# Patient Record
(demographics unavailable — no encounter records)

---

## 2025-02-13 NOTE — HISTORY OF PRESENT ILLNESS
[FreeTextEntry1] : Mary presents to the office today with h/o OAB managed with oxybutynin 5mg. Pt states has been on oxybutynin for about 3 years now. Reports 60-70% improvement in OAB symptoms. Pt states frequency and nocturia have improved but urgency and UUI still remains. Pt states dry mouth has become a lot more bothersome and now pt c/o dry eyes and nose. Denies s/s of UTI. Denies sensation of incomplete bladder emptying. PVR normal today. Pt would like to discuss alternative OAB management options today.   PVR: 19 BP: 142/86

## 2025-02-13 NOTE — DISCUSSION/SUMMARY
[FreeTextEntry1] : We discussed management options for overactive bladder including observation, behavioral modifications and bladder training, physical therapy and medications including anticholinergics and beta 3 agonists. Risks/benefits of myrbetriq discussed and we will start that. Gemtesa is not covered in pt's plan. We discussed additional treatment options including sacral neuromodulation, PTNS and intra detrusor Botox. At this time pt would like to continue with medication management but due to bothersome SEs with Oxybuytnin would not like to c/w Oxybutynin. Myrbetriq 25mg eRx sent to pt's preferred pharmacy. Medication instructions given. Pt aware to monitor bp and d/c Myrbetriq if bp >160/100. Pt verbalized understanding. Pt to RTO in 4-6 weeks for PVR check and evaluate Myrbetriq effectiveness. All questions answered. Instructed to call with any questions or concerns.

## 2025-03-25 NOTE — PHYSICAL EXAM
[No Acute Distress] : in no acute distress [Well developed] : well developed [Well Nourished] : ~L well nourished [Good Hygeine] : demonstrates good hygeine [Oriented x3] : oriented to person, place, and time [Normal Memory] : ~T memory was ~L unimpaired [Normal Mood/Affect] : mood and affect are normal [Cough] : no cough [None] : no CVA tenderness [Warm and Dry] : was warm and dry to touch [Normal Gait] : gait was normal

## 2025-03-25 NOTE — DISCUSSION/SUMMARY
[FreeTextEntry1] : Pt doing well with Myrbetriq 25mg but would like to see greater improvement in OAB symptoms. Discussed increasing Myrbetriq to 50mg. Pt agreeable. Myrbetriq 50mg eRx sent to pt's preferred pharmacy. Pt aware to continue monitoring bp with increased dose and d/c Myrbetriq if bp >160/100. Pt verbalized understanding. Pt to RTO in 6 weeks for PVR check and to evaluate Myrbetriq effectiveness at increased dose. All questions answered. Instructed to call with any questions or concerns.

## 2025-03-25 NOTE — HISTORY OF PRESENT ILLNESS
[FreeTextEntry1] : Mary presents to the office today with h/o OAB. Pt last seen on 2/13/25 and was started on Myrbetriq 25mg. Pt was on Oxybutynin in the past but d/kamila due to bothersome SE such as dry mouth and dry eye. Pt reports 70-80% symptom improvement since starting Myrbetriq 25mg and is happy with this. Pt reports nocturia x1 and is able to hold urine longer throughout the day. Pt states urgency still persists but significantly improved than prior to Myrbetriq. Denies s/s of UTI. Denies sensation of incomplete bladder emptying. PVR normal today. Denies SE of Myrbetriq.  PVR: 0 BP: 132/78

## 2025-05-07 NOTE — HISTORY OF PRESENT ILLNESS
[FreeTextEntry1] : Mary presents to the office today with h/o OAB managed with Myrbetriq 50mg. Pt was increased to Myrbetriq 50mg at last visit on 3/25/25. Pt reports 70-80% improvement with Myrbetriq 50mg which is unchanged from when she was taking Myrbetriq 25mg. Pt states she is able to hold urine longer during the day and UUI has improved as well. Pt states nocturia is still 2-3x/night and she continues to wear Depends during the day and night. Pt states during the day Depends are not as wet as they were prior to Myrbetriq, but at night Depends are still wet. Denies s/s of UTI. Denies sensation of incomplete bladder emptying. Denies SE of Myrbetriq.   PVR: 0 BP: 132/88

## 2025-05-07 NOTE — DISCUSSION/SUMMARY
[FreeTextEntry1] : Mary presents to the office today for OAB f/u. Pt currently on Myrbetriq 50mg and would like to continue with current management x 6 months. Pt states will c/w Myrbetriq for now but is considering Botox b/c nocturia is still very bothersome. We reviewed the risks of the Botox procedure including but not limited to: UTI, recurrent UTI, incomplete emptying requiring catheterization, failure of procedure, dysuria, hematuria, bladder pain, painful urination, injury to the bladder, systemic effects like muscle weakness. We discussed the transient effects of Botox and need for future injections once effects wear off, if successful and desired by patient. We discussed pre-procedure instructions including antibiotics which start 3 days prior to procedure and continue post procedure. Pt will call when Myrbetriq 50mg refills are needed. Pt to RTO in 6 months or sooner if needed and will decide if she will c/w Myrbetriq or proceed with Botox. Pt aware UDS needed prior to Botox. All questions answered. Instructed to call with any questions or concerns.

## 2025-05-21 NOTE — HISTORY OF PRESENT ILLNESS
[FreeTextEntry1] : MEDICATION RENEWAL [de-identified] : TAKING LEVOTHYROXINE AS DIRECTED [facility].  HAD ENDOSCOPY.  PRESCRIBED MEDICATION BUT CAN'T RECALL WHICH. FOLLOWS WITH PODIATRY DR. WHITE.   WALKER. CHAIR EXAM REQUESTED